# Patient Record
Sex: FEMALE | Race: WHITE | NOT HISPANIC OR LATINO
[De-identification: names, ages, dates, MRNs, and addresses within clinical notes are randomized per-mention and may not be internally consistent; named-entity substitution may affect disease eponyms.]

---

## 2017-04-21 PROCEDURE — 82607 VITAMIN B-12: CPT | Performed by: INTERNAL MEDICINE

## 2017-04-21 PROCEDURE — 82746 ASSAY OF FOLIC ACID SERUM: CPT | Performed by: INTERNAL MEDICINE

## 2017-08-16 PROBLEM — E78.00 PURE HYPERCHOLESTEROLEMIA: Status: ACTIVE | Noted: 2017-08-16

## 2017-11-01 ENCOUNTER — CHARTING TRANS (OUTPATIENT)
Dept: OTHER | Age: 77
End: 2017-11-01

## 2017-11-06 ENCOUNTER — HOSPITAL ENCOUNTER (OUTPATIENT)
Dept: GENERAL RADIOLOGY | Age: 77
End: 2017-11-06
Attending: INTERNAL MEDICINE
Payer: MEDICARE

## 2017-11-06 ENCOUNTER — HOSPITAL ENCOUNTER (OUTPATIENT)
Dept: GENERAL RADIOLOGY | Age: 77
Discharge: HOME | End: 2017-11-06
Attending: INTERNAL MEDICINE
Payer: MEDICARE

## 2017-11-06 DIAGNOSIS — M54.41 CHRONIC LOW BACK PAIN WITH RIGHT-SIDED SCIATICA, UNSPECIFIED BACK PAIN LATERALITY: ICD-10-CM

## 2017-11-06 DIAGNOSIS — G89.29 CHRONIC LOW BACK PAIN WITH RIGHT-SIDED SCIATICA, UNSPECIFIED BACK PAIN LATERALITY: ICD-10-CM

## 2017-11-06 DIAGNOSIS — M25.552 PAIN OF LEFT HIP JOINT: ICD-10-CM

## 2017-11-06 DIAGNOSIS — M15.9 PRIMARY OSTEOARTHRITIS INVOLVING MULTIPLE JOINTS: ICD-10-CM

## 2017-11-06 PROCEDURE — 77077 JOINT SURVEY SINGLE VIEW: CPT | Performed by: INTERNAL MEDICINE

## 2017-11-06 PROCEDURE — 72110 X-RAY EXAM L-2 SPINE 4/>VWS: CPT | Performed by: INTERNAL MEDICINE

## 2017-11-06 PROCEDURE — 73502 X-RAY EXAM HIP UNI 2-3 VIEWS: CPT | Performed by: INTERNAL MEDICINE

## 2017-11-26 ENCOUNTER — APPOINTMENT (OUTPATIENT)
Dept: CT IMAGING | Age: 77
End: 2017-11-26
Attending: PHYSICIAN ASSISTANT
Payer: MEDICARE

## 2017-11-26 ENCOUNTER — HOSPITAL ENCOUNTER (OUTPATIENT)
Age: 77
Discharge: HOME | End: 2017-11-26
Payer: MEDICARE

## 2017-11-26 VITALS
HEIGHT: 66 IN | TEMPERATURE: 98 F | RESPIRATION RATE: 16 BRPM | BODY MASS INDEX: 25.71 KG/M2 | OXYGEN SATURATION: 99 % | SYSTOLIC BLOOD PRESSURE: 160 MMHG | DIASTOLIC BLOOD PRESSURE: 66 MMHG | HEART RATE: 60 BPM | WEIGHT: 160 LBS

## 2017-11-26 DIAGNOSIS — R10.9 ABDOMINAL PAIN, RIGHT LATERAL: Primary | ICD-10-CM

## 2017-11-26 PROCEDURE — 74177 CT ABD & PELVIS W/CONTRAST: CPT | Performed by: PHYSICIAN ASSISTANT

## 2017-11-26 PROCEDURE — 99204 OFFICE O/P NEW MOD 45 MIN: CPT

## 2017-11-26 PROCEDURE — 81002 URINALYSIS NONAUTO W/O SCOPE: CPT | Performed by: PHYSICIAN ASSISTANT

## 2017-11-26 PROCEDURE — 99214 OFFICE O/P EST MOD 30 MIN: CPT

## 2017-11-26 PROCEDURE — 85025 COMPLETE CBC W/AUTO DIFF WBC: CPT | Performed by: PHYSICIAN ASSISTANT

## 2017-11-26 PROCEDURE — 80047 BASIC METABLC PNL IONIZED CA: CPT

## 2017-11-26 PROCEDURE — 36415 COLL VENOUS BLD VENIPUNCTURE: CPT

## 2017-11-26 NOTE — ED INITIAL ASSESSMENT (HPI)
Patient presents awake and alert with complaints of right middle quadrant abdominal pain x 3 days. Last bowel movement formed on Tuesday. Patient states that the pain is constant in nature and feels like a pressure.  Patient states that the pain changes in

## 2017-11-26 NOTE — ED PROVIDER NOTES
Patient Seen in: [facility] END    History   Patient presents with:  Abdomen/Flank Pain (GI/)    Stated Complaint: lower abdominal pain started wednesday    HPI    CHIEF COMPLAINT: Right-sided abdominal pain    HISTORY OF PRESENT The patient's medication list, past medical history and social history elements is as listed in today's nurse's notes are reviewed and agree.  The patient's family history is reviewed and is noncontributory to the presenting problem, except as indicated as PAIN MANAGEMENT  11/11/2014: INJECTION, W/WO CONTRAST, DX/THERAPEUTIC SUBST* N/A      Comment: Procedure: CERVICAL EPIDURAL;  Surgeon:                Ani Gomez MD;  Location: [place] [address] MANAGEMENT  7/2001: Ekaterina De Leon Comment: occ      Review of Systems    Positive for stated complaint: lower abdominal pain started wednesday  Other systems are as noted in HPI. Constitutional and vital signs reviewed.       All other systems reviewed and negative except as noted above Psychiatric: She has a normal mood and affect. Her behavior is normal. Judgment and thought content normal.   Nursing note and vitals reviewed.         ED Course     Labs Reviewed   POCT URINALYSIS DIPSTICK - Abnormal; Notable for the following:        Resu 1210: Patient is alert, no acute distress. Patient's abdominal examination has not changed, still some mild tenderness in the right abdomen. Patient CT the abdomen did not show any acute findings.   Patient will be referred back to her primary care physic

## 2018-02-07 ENCOUNTER — HOSPITAL ENCOUNTER (OUTPATIENT)
Dept: CT IMAGING | Facility: HOSPITAL | Age: 78
Discharge: HOME | End: 2018-02-07
Attending: UROLOGY
Payer: MEDICARE

## 2018-02-07 ENCOUNTER — APPOINTMENT (OUTPATIENT)
Dept: LAB | Facility: HOSPITAL | Age: 78
End: 2018-02-07
Attending: UROLOGY
Payer: MEDICARE

## 2018-02-07 VITALS
WEIGHT: 155 LBS | HEIGHT: 65 IN | RESPIRATION RATE: 16 BRPM | SYSTOLIC BLOOD PRESSURE: 126 MMHG | BODY MASS INDEX: 25.83 KG/M2 | OXYGEN SATURATION: 100 % | DIASTOLIC BLOOD PRESSURE: 66 MMHG | HEART RATE: 69 BPM | TEMPERATURE: 98 F

## 2018-02-07 DIAGNOSIS — N28.89 RENAL MASS, LEFT: ICD-10-CM

## 2018-02-07 DIAGNOSIS — N28.89 LEFT RENAL MASS: ICD-10-CM

## 2018-02-07 DIAGNOSIS — N28.89 RENAL MASS, LEFT: Primary | ICD-10-CM

## 2018-02-07 LAB
ERYTHROCYTE [DISTWIDTH] IN BLOOD BY AUTOMATED COUNT: 12 % (ref 11.5–16)
HCT VFR BLD AUTO: 39.5 % (ref 34–50)
HGB BLD-MCNC: 13 G/DL (ref 12–16)
INR BLD: 0.93 (ref 0.89–1.11)
MCH RBC QN AUTO: 32.1 PG (ref 27–33.2)
MCHC RBC AUTO-ENTMCNC: 32.9 G/DL (ref 31–37)
MCV RBC AUTO: 97.5 FL (ref 81–100)
PLATELET # BLD AUTO: 327 10(3)UL (ref 150–450)
PSA SERPL DL<=0.01 NG/ML-MCNC: 12.5 SECONDS (ref 12–14.3)
RBC # BLD AUTO: 4.05 X10(6)UL (ref 3.8–5.1)
RED CELL DISTRIBUTION WIDTH-SD: 43.5 FL (ref 35.1–46.3)
WBC # BLD AUTO: 5.5 X10(3) UL (ref 4–13)

## 2018-02-07 PROCEDURE — 88341 IMHCHEM/IMCYTCHM EA ADD ANTB: CPT | Performed by: UROLOGY

## 2018-02-07 PROCEDURE — 36415 COLL VENOUS BLD VENIPUNCTURE: CPT

## 2018-02-07 PROCEDURE — 88108 CYTOPATH CONCENTRATE TECH: CPT | Performed by: UROLOGY

## 2018-02-07 PROCEDURE — 85027 COMPLETE CBC AUTOMATED: CPT

## 2018-02-07 PROCEDURE — 88342 IMHCHEM/IMCYTCHM 1ST ANTB: CPT | Performed by: UROLOGY

## 2018-02-07 PROCEDURE — 77012 CT SCAN FOR NEEDLE BIOPSY: CPT | Performed by: UROLOGY

## 2018-02-07 PROCEDURE — 85610 PROTHROMBIN TIME: CPT

## 2018-02-07 PROCEDURE — 88305 TISSUE EXAM BY PATHOLOGIST: CPT | Performed by: UROLOGY

## 2018-02-07 PROCEDURE — 50200 RENAL BIOPSY PERQ: CPT | Performed by: UROLOGY

## 2018-02-07 PROCEDURE — 99152 MOD SED SAME PHYS/QHP 5/>YRS: CPT | Performed by: UROLOGY

## 2018-02-07 RX ORDER — FLUMAZENIL 0.1 MG/ML
0.2 INJECTION, SOLUTION INTRAVENOUS AS NEEDED
Status: DISCONTINUED | OUTPATIENT
Start: 2018-02-07 | End: 2018-02-16

## 2018-02-07 RX ORDER — MIDAZOLAM HYDROCHLORIDE 1 MG/ML
1 INJECTION INTRAMUSCULAR; INTRAVENOUS EVERY 5 MIN PRN
Status: ACTIVE | OUTPATIENT
Start: 2018-02-07 | End: 2018-02-07

## 2018-02-07 RX ORDER — NALOXONE HYDROCHLORIDE 0.4 MG/ML
80 INJECTION, SOLUTION INTRAMUSCULAR; INTRAVENOUS; SUBCUTANEOUS AS NEEDED
Status: DISCONTINUED | OUTPATIENT
Start: 2018-02-07 | End: 2018-02-16

## 2018-02-07 RX ORDER — SODIUM CHLORIDE 9 MG/ML
INJECTION, SOLUTION INTRAVENOUS CONTINUOUS
Status: DISCONTINUED | OUTPATIENT
Start: 2018-02-07 | End: 2018-02-16

## 2018-02-07 RX ORDER — MIDAZOLAM HYDROCHLORIDE 1 MG/ML
INJECTION INTRAMUSCULAR; INTRAVENOUS
Status: COMPLETED
Start: 2018-02-07 | End: 2018-02-07

## 2018-02-07 RX ADMIN — MIDAZOLAM HYDROCHLORIDE 0.5 MG: 1 INJECTION INTRAMUSCULAR; INTRAVENOUS at 09:39:00

## 2018-02-07 RX ADMIN — MIDAZOLAM HYDROCHLORIDE 1 MG: 1 INJECTION INTRAMUSCULAR; INTRAVENOUS at 09:34:00

## 2018-02-07 RX ADMIN — SODIUM CHLORIDE: 9 INJECTION, SOLUTION INTRAVENOUS at 09:30:00

## 2018-02-07 NOTE — IMAGING NOTE
CT guided left renal mass with Dr. Rios Carey. Pt tolerated well. Vss. Fluid and biopsy samples sent to lab. Biopsy site and dressing intact. Report to [facility]. Transport to room 2258.

## 2018-08-17 ENCOUNTER — LABORATORY ENCOUNTER (OUTPATIENT)
Dept: LAB | Age: 78
End: 2018-08-17
Payer: MEDICARE

## 2018-08-17 DIAGNOSIS — C64.9 RENAL CELL CARCINOMA (HCC): ICD-10-CM

## 2018-08-17 LAB
ANION GAP SERPL CALC-SCNC: 10 MMOL/L (ref 0–18)
ANTIBODY SCREEN: NEGATIVE
BASOPHILS # BLD AUTO: 0.05 X10(3) UL (ref 0–0.1)
BASOPHILS NFR BLD AUTO: 0.8 %
BUN BLD-MCNC: 22 MG/DL (ref 8–20)
BUN/CREAT SERPL: 26.8 (ref 10–20)
CALCIUM BLD-MCNC: 9.4 MG/DL (ref 8.3–10.3)
CHLORIDE SERPL-SCNC: 103 MMOL/L (ref 101–111)
CO2 SERPL-SCNC: 27 MMOL/L (ref 22–32)
CREAT BLD-MCNC: 0.82 MG/DL (ref 0.55–1.02)
EOSINOPHIL # BLD AUTO: 0.27 X10(3) UL (ref 0–0.3)
EOSINOPHIL NFR BLD AUTO: 4.5 %
ERYTHROCYTE [DISTWIDTH] IN BLOOD BY AUTOMATED COUNT: 12 % (ref 11.5–16)
GLUCOSE BLD-MCNC: 95 MG/DL (ref 70–99)
HCT VFR BLD AUTO: 40.2 % (ref 34–50)
HGB BLD-MCNC: 13.3 G/DL (ref 12–16)
IMMATURE GRANULOCYTE COUNT: 0.02 X10(3) UL (ref 0–1)
IMMATURE GRANULOCYTE RATIO %: 0.3 %
LYMPHOCYTES # BLD AUTO: 2.09 X10(3) UL (ref 0.9–4)
LYMPHOCYTES NFR BLD AUTO: 34.9 %
MCH RBC QN AUTO: 32.4 PG (ref 27–33.2)
MCHC RBC AUTO-ENTMCNC: 33.1 G/DL (ref 31–37)
MCV RBC AUTO: 98 FL (ref 81–100)
MONOCYTES # BLD AUTO: 0.65 X10(3) UL (ref 0.1–1)
MONOCYTES NFR BLD AUTO: 10.9 %
NEUTROPHIL ABS PRELIM: 2.91 X10 (3) UL (ref 1.3–6.7)
NEUTROPHILS # BLD AUTO: 2.91 X10(3) UL (ref 1.3–6.7)
NEUTROPHILS NFR BLD AUTO: 48.6 %
OSMOLALITY SERPL CALC.SUM OF ELEC: 293 MOSM/KG (ref 275–295)
PLATELET # BLD AUTO: 314 10(3)UL (ref 150–450)
POTASSIUM SERPL-SCNC: 4.2 MMOL/L (ref 3.6–5.1)
RBC # BLD AUTO: 4.1 X10(6)UL (ref 3.8–5.1)
RED CELL DISTRIBUTION WIDTH-SD: 43.1 FL (ref 35.1–46.3)
RH BLOOD TYPE: POSITIVE
SODIUM SERPL-SCNC: 140 MMOL/L (ref 136–144)
WBC # BLD AUTO: 6 X10(3) UL (ref 4–13)

## 2018-08-17 PROCEDURE — 86900 BLOOD TYPING SEROLOGIC ABO: CPT

## 2018-08-17 PROCEDURE — 36415 COLL VENOUS BLD VENIPUNCTURE: CPT

## 2018-08-17 PROCEDURE — 85025 COMPLETE CBC W/AUTO DIFF WBC: CPT

## 2018-08-17 PROCEDURE — 86901 BLOOD TYPING SEROLOGIC RH(D): CPT

## 2018-08-17 PROCEDURE — 80048 BASIC METABOLIC PNL TOTAL CA: CPT

## 2018-08-17 PROCEDURE — 86850 RBC ANTIBODY SCREEN: CPT

## 2018-08-24 ENCOUNTER — HOSPITAL ENCOUNTER (INPATIENT)
Facility: HOSPITAL | Age: 78
LOS: 3 days | Discharge: HOME | DRG: 658 | End: 2018-08-27
Attending: UROLOGY | Admitting: UROLOGY
Payer: MEDICARE

## 2018-08-24 ENCOUNTER — ANESTHESIA (OUTPATIENT)
Dept: SURGERY | Facility: HOSPITAL | Age: 78
DRG: 658 | End: 2018-08-24
Payer: MEDICARE

## 2018-08-24 ENCOUNTER — ANESTHESIA EVENT (OUTPATIENT)
Dept: SURGERY | Facility: HOSPITAL | Age: 78
DRG: 658 | End: 2018-08-24
Payer: MEDICARE

## 2018-08-24 DIAGNOSIS — C64.2 RENAL CELL CARCINOMA OF LEFT KIDNEY (HCC): ICD-10-CM

## 2018-08-24 DIAGNOSIS — C64.9 RENAL CELL CARCINOMA (HCC): Primary | ICD-10-CM

## 2018-08-24 PROCEDURE — 88307 TISSUE EXAM BY PATHOLOGIST: CPT | Performed by: UROLOGY

## 2018-08-24 PROCEDURE — 8E0W4CZ ROBOTIC ASSISTED PROCEDURE OF TRUNK REGION, PERCUTANEOUS ENDOSCOPIC APPROACH: ICD-10-PCS | Performed by: UROLOGY

## 2018-08-24 PROCEDURE — 0TB14ZZ EXCISION OF LEFT KIDNEY, PERCUTANEOUS ENDOSCOPIC APPROACH: ICD-10-PCS | Performed by: UROLOGY

## 2018-08-24 RX ORDER — ALBUTEROL SULFATE 90 UG/1
1 AEROSOL, METERED RESPIRATORY (INHALATION) EVERY 6 HOURS PRN
Status: DISCONTINUED | OUTPATIENT
Start: 2018-08-24 | End: 2018-08-27

## 2018-08-24 RX ORDER — ASPIRIN 81 MG/1
81 TABLET ORAL DAILY
COMMUNITY

## 2018-08-24 RX ORDER — LABETALOL HYDROCHLORIDE 5 MG/ML
5 INJECTION, SOLUTION INTRAVENOUS EVERY 5 MIN PRN
Status: DISCONTINUED | OUTPATIENT
Start: 2018-08-24 | End: 2018-08-24 | Stop reason: HOSPADM

## 2018-08-24 RX ORDER — ESCITALOPRAM OXALATE 5 MG/1
5 TABLET ORAL DAILY
Status: DISCONTINUED | OUTPATIENT
Start: 2018-08-24 | End: 2018-08-27

## 2018-08-24 RX ORDER — SODIUM CHLORIDE, SODIUM LACTATE, POTASSIUM CHLORIDE, CALCIUM CHLORIDE 600; 310; 30; 20 MG/100ML; MG/100ML; MG/100ML; MG/100ML
INJECTION, SOLUTION INTRAVENOUS CONTINUOUS
Status: DISCONTINUED | OUTPATIENT
Start: 2018-08-24 | End: 2018-08-24 | Stop reason: HOSPADM

## 2018-08-24 RX ORDER — ONDANSETRON 2 MG/ML
4 INJECTION INTRAMUSCULAR; INTRAVENOUS AS NEEDED
Status: DISCONTINUED | OUTPATIENT
Start: 2018-08-24 | End: 2018-08-24 | Stop reason: HOSPADM

## 2018-08-24 RX ORDER — CEFAZOLIN SODIUM/WATER 2 G/20 ML
2 SYRINGE (ML) INTRAVENOUS ONCE
Status: COMPLETED | OUTPATIENT
Start: 2018-08-24 | End: 2018-08-24

## 2018-08-24 RX ORDER — ACETAMINOPHEN 500 MG
1000 TABLET ORAL ONCE
Status: DISCONTINUED | OUTPATIENT
Start: 2018-08-24 | End: 2018-08-24 | Stop reason: HOSPADM

## 2018-08-24 RX ORDER — LOSARTAN POTASSIUM 100 MG/1
100 TABLET ORAL DAILY
COMMUNITY
End: 2018-09-25

## 2018-08-24 RX ORDER — MORPHINE SULFATE 4 MG/ML
2 INJECTION, SOLUTION INTRAMUSCULAR; INTRAVENOUS EVERY 5 MIN PRN
Status: DISCONTINUED | OUTPATIENT
Start: 2018-08-24 | End: 2018-08-24 | Stop reason: HOSPADM

## 2018-08-24 RX ORDER — MORPHINE SULFATE 4 MG/ML
INJECTION, SOLUTION INTRAMUSCULAR; INTRAVENOUS
Status: DISPENSED
Start: 2018-08-24 | End: 2018-08-25

## 2018-08-24 RX ORDER — HEPARIN SODIUM 5000 [USP'U]/ML
5000 INJECTION, SOLUTION INTRAVENOUS; SUBCUTANEOUS ONCE
Status: COMPLETED | OUTPATIENT
Start: 2018-08-24 | End: 2018-08-24

## 2018-08-24 RX ORDER — LOSARTAN POTASSIUM 100 MG/1
100 TABLET ORAL DAILY
Status: DISCONTINUED | OUTPATIENT
Start: 2018-08-25 | End: 2018-08-27

## 2018-08-24 RX ORDER — NALOXONE HYDROCHLORIDE 0.4 MG/ML
80 INJECTION, SOLUTION INTRAMUSCULAR; INTRAVENOUS; SUBCUTANEOUS AS NEEDED
Status: DISCONTINUED | OUTPATIENT
Start: 2018-08-24 | End: 2018-08-24 | Stop reason: HOSPADM

## 2018-08-24 RX ORDER — AMLODIPINE BESYLATE 5 MG/1
5 TABLET ORAL DAILY
COMMUNITY
End: 2018-09-25

## 2018-08-24 RX ORDER — MELOXICAM 7.5 MG/1
7.5 TABLET ORAL DAILY
Status: ON HOLD | COMMUNITY
End: 2018-08-27

## 2018-08-24 RX ORDER — DOCUSATE SODIUM 100 MG/1
100 CAPSULE, LIQUID FILLED ORAL 2 TIMES DAILY
Status: DISCONTINUED | OUTPATIENT
Start: 2018-08-24 | End: 2018-08-27

## 2018-08-24 RX ORDER — BUPIVACAINE HYDROCHLORIDE 2.5 MG/ML
INJECTION, SOLUTION EPIDURAL; INFILTRATION; INTRACAUDAL AS NEEDED
Status: DISCONTINUED | OUTPATIENT
Start: 2018-08-24 | End: 2018-08-24 | Stop reason: HOSPADM

## 2018-08-24 RX ORDER — HYDROCODONE BITARTRATE AND ACETAMINOPHEN 5; 325 MG/1; MG/1
2 TABLET ORAL EVERY 4 HOURS PRN
Status: DISCONTINUED | OUTPATIENT
Start: 2018-08-24 | End: 2018-08-27

## 2018-08-24 RX ORDER — AMLODIPINE BESYLATE 5 MG/1
5 TABLET ORAL DAILY
Status: DISCONTINUED | OUTPATIENT
Start: 2018-08-25 | End: 2018-08-27

## 2018-08-24 RX ORDER — ESCITALOPRAM OXALATE 5 MG/1
5 TABLET ORAL DAILY
COMMUNITY
End: 2018-09-25

## 2018-08-24 RX ORDER — ONDANSETRON 2 MG/ML
4 INJECTION INTRAMUSCULAR; INTRAVENOUS EVERY 6 HOURS PRN
Status: DISCONTINUED | OUTPATIENT
Start: 2018-08-24 | End: 2018-08-27

## 2018-08-24 RX ORDER — HYDROCODONE BITARTRATE AND ACETAMINOPHEN 5; 325 MG/1; MG/1
1 TABLET ORAL EVERY 4 HOURS PRN
Status: DISCONTINUED | OUTPATIENT
Start: 2018-08-24 | End: 2018-08-27

## 2018-08-24 RX ORDER — SODIUM CHLORIDE 9 MG/ML
INJECTION, SOLUTION INTRAVENOUS CONTINUOUS
Status: DISCONTINUED | OUTPATIENT
Start: 2018-08-24 | End: 2018-08-27

## 2018-08-24 RX ORDER — MORPHINE SULFATE 4 MG/ML
2 INJECTION, SOLUTION INTRAMUSCULAR; INTRAVENOUS EVERY 2 HOUR PRN
Status: DISCONTINUED | OUTPATIENT
Start: 2018-08-24 | End: 2018-08-27

## 2018-08-24 RX ORDER — ACETAMINOPHEN 325 MG/1
650 TABLET ORAL EVERY 4 HOURS PRN
Status: DISCONTINUED | OUTPATIENT
Start: 2018-08-24 | End: 2018-08-26

## 2018-08-24 RX ORDER — CEFAZOLIN SODIUM/WATER 2 G/20 ML
2 SYRINGE (ML) INTRAVENOUS EVERY 8 HOURS
Status: COMPLETED | OUTPATIENT
Start: 2018-08-24 | End: 2018-08-25

## 2018-08-24 RX ORDER — ONDANSETRON 4 MG/1
4 TABLET, FILM COATED ORAL EVERY 6 HOURS PRN
Status: DISCONTINUED | OUTPATIENT
Start: 2018-08-24 | End: 2018-08-27

## 2018-08-24 RX ORDER — MORPHINE SULFATE 4 MG/ML
4 INJECTION, SOLUTION INTRAMUSCULAR; INTRAVENOUS EVERY 2 HOUR PRN
Status: DISCONTINUED | OUTPATIENT
Start: 2018-08-24 | End: 2018-08-27

## 2018-08-24 RX ORDER — MIDAZOLAM HYDROCHLORIDE 1 MG/ML
1 INJECTION INTRAMUSCULAR; INTRAVENOUS EVERY 5 MIN PRN
Status: DISCONTINUED | OUTPATIENT
Start: 2018-08-24 | End: 2018-08-24 | Stop reason: HOSPADM

## 2018-08-24 NOTE — PLAN OF CARE
NURSING ADMISSION NOTE      Patient admitted via bed from PACU. Oriented to room. Safety precautions initiated. Bed in low position. Call light in reach. Admission database completed with the help of spouse and pt. Pt denies nausea.  Will medicate

## 2018-08-24 NOTE — ANESTHESIA PREPROCEDURE EVALUATION
PRE-OP EVALUATION    Patient Name: Alvina Rodrigez    Pre-op Diagnosis: Renal cell carcinoma of left kidney ([facility] [address] [C64.2]    Procedure(s):  XI ROBOTIC ASSISTED LAPAROSCOPIC LEFT PARTIAL NEPHRECTOMY    Surgeon(s) and Role:     * Denise Ibarra MD - P 1956  2001: CHOLECYSTECTOMY  11/18/2009: COLONOSCOPY      Comment: internal/external hemorrhoids; repeat in 8-10                years  12/03: COLONOSCOPY,DIAGNOSTIC      Comment: internal hemorrhoids; repeat in 5-7 years due                to family histor Comment: Procedure: ;  Surgeon: Steven Mitchell MD;                 Location: [address]: REMOVAL OF TONSILS,12+ Y/O  7/7/04: SPECIAL SERVICE OR REPORT      Comment: LEFT KNEE ARTHROSCOPY FOR MENISCAL                TEAR;BACH'S C

## 2018-08-24 NOTE — PLAN OF CARE
Problem: Patient/Family Goals  Goal: Patient/Family Long Term Goal  Patient's Long Term Goal: feel better and go home  Interventions:  - follow plan of care  - See additional Care Plan goals for specific interventions   Outcome: Progressing    Goal: Patien

## 2018-08-24 NOTE — CONSULTS
[facility] hospitalist initial consult note  PCP Jess Hidalgo MD  Consulted at the request of Dr. Sarah England  Reason for consult medical co-management  HPI 79 yo female with multiple medical problems including but not limited to asthma, melanoma here s/p Location: [address] [place] [address] Bill Ripton MANAGEMENT  10/23/2014: INJECTION, W/WO CONTRAST, DX/THERAPEUTIC SUBST* N/A      Comment: Procedure: CERVICAL EPIDURAL;  Surgeon:                Neto Parks MD;  Location: [place] [address] Onset   • Heart Disorder Father      MI   • Cancer Father      MELANOMA   • Heart Disorder Mother      CHF  CAD   • Cancer Mother      MELANOMA   • Cancer Maternal Grandmother      CERVICAL   • Cancer Paternal Grandmother      OVARIAN   • Heart Disorder Evonne Garcia supple  Lymph no tender cervical LAD  CV: RRR, nl S1/S2  Pulm: CTA b/l  Abd; soft, not distended, incisions c/d/i, +drain,  +BS  Ext: no edema    Labs  No results for input(s): RBC, HGB, HCT, MCV, MCH, MCHC, RDW, NEPRELIM, WBC, PLT in the last 168 hours.

## 2018-08-24 NOTE — H&P
Pre-op Diagnosis: Renal cell carcinoma of left kidney (HCC) [C64.2]    The above referenced H&P was reviewed by Ángel Calvin MD on 8/24/2018, the patient was examined and no significant changes have occurred in the patient's condition since the H&P wa

## 2018-08-24 NOTE — ANESTHESIA POSTPROCEDURE EVALUATION
[address] Patient Status:  Surgery Admit   Age/Gender 78year old female MRN [id number]   [facility] SURGERY Attending Tod Mcgrath MD   Hosp Day # 0 PCP Sue Swenson MD       Anesthesia Post-op Note

## 2018-08-25 LAB
ANION GAP SERPL CALC-SCNC: 6 MMOL/L (ref 0–18)
BUN BLD-MCNC: 14 MG/DL (ref 8–20)
BUN/CREAT SERPL: 14 (ref 10–20)
CALCIUM BLD-MCNC: 8.7 MG/DL (ref 8.3–10.3)
CHLORIDE SERPL-SCNC: 105 MMOL/L (ref 101–111)
CO2 SERPL-SCNC: 28 MMOL/L (ref 22–32)
CREAT BLD-MCNC: 1 MG/DL (ref 0.55–1.02)
ERYTHROCYTE [DISTWIDTH] IN BLOOD BY AUTOMATED COUNT: 11.8 % (ref 11.5–16)
GLUCOSE BLD-MCNC: 112 MG/DL (ref 70–99)
HCT VFR BLD AUTO: 34.9 % (ref 34–50)
HGB BLD-MCNC: 11.6 G/DL (ref 12–16)
MCH RBC QN AUTO: 32.9 PG (ref 27–33.2)
MCHC RBC AUTO-ENTMCNC: 33.2 G/DL (ref 31–37)
MCV RBC AUTO: 98.9 FL (ref 81–100)
OSMOLALITY SERPL CALC.SUM OF ELEC: 289 MOSM/KG (ref 275–295)
PLATELET # BLD AUTO: 263 10(3)UL (ref 150–450)
POTASSIUM SERPL-SCNC: 4.8 MMOL/L (ref 3.6–5.1)
RBC # BLD AUTO: 3.53 X10(6)UL (ref 3.8–5.1)
RED CELL DISTRIBUTION WIDTH-SD: 42.6 FL (ref 35.1–46.3)
SODIUM SERPL-SCNC: 139 MMOL/L (ref 136–144)
WBC # BLD AUTO: 13.1 X10(3) UL (ref 4–13)

## 2018-08-25 PROCEDURE — 85027 COMPLETE CBC AUTOMATED: CPT | Performed by: UROLOGY

## 2018-08-25 PROCEDURE — 80048 BASIC METABOLIC PNL TOTAL CA: CPT | Performed by: UROLOGY

## 2018-08-25 NOTE — PROGRESS NOTES
[facility] Urology   Progress Note  Shivani Sepulveda Patient Status:  Inpatient    10/14/1940 MRN [id number]   [facility] 3NW-A Attending Leticia Nageotte, MD   Hosp Day # 1 PCP Rosa Betts MD     Subjective:  No issue

## 2018-08-25 NOTE — PROGRESS NOTES
[facility] hospitalist daily note  Seen/examined on 8/25/18    S; no chest pain, no SOB, did have pain in the abdomen.  RN instructed to notify surgery  Per patient she is passing gas    Medications in EPIC    PE    08/25/18  1300   BP: 151/66   Pulse: 77   Resp:

## 2018-08-25 NOTE — PLAN OF CARE
Maintains or returns to baseline bowel function Not Progressing      Minimal or absence of nausea and vomiting Progressing      Maintains hematologic stability Progressing      Free from bleeding injury Progressing      Return mobility to safest level of f

## 2018-08-26 LAB
ERYTHROCYTE [DISTWIDTH] IN BLOOD BY AUTOMATED COUNT: 11.9 % (ref 11.5–16)
HCT VFR BLD AUTO: 35.4 % (ref 34–50)
HGB BLD-MCNC: 11.3 G/DL (ref 12–16)
MCH RBC QN AUTO: 32.2 PG (ref 27–33.2)
MCHC RBC AUTO-ENTMCNC: 31.9 G/DL (ref 31–37)
MCV RBC AUTO: 100.9 FL (ref 81–100)
PLATELET # BLD AUTO: 249 10(3)UL (ref 150–450)
RBC # BLD AUTO: 3.51 X10(6)UL (ref 3.8–5.1)
RED CELL DISTRIBUTION WIDTH-SD: 44.6 FL (ref 35.1–46.3)
WBC # BLD AUTO: 9.3 X10(3) UL (ref 4–13)

## 2018-08-26 PROCEDURE — 85027 COMPLETE CBC AUTOMATED: CPT | Performed by: UROLOGY

## 2018-08-26 RX ORDER — BISACODYL 10 MG
10 SUPPOSITORY, RECTAL RECTAL
Status: DISCONTINUED | OUTPATIENT
Start: 2018-08-26 | End: 2018-08-27

## 2018-08-26 RX ORDER — ACETAMINOPHEN 500 MG
1000 TABLET ORAL EVERY 8 HOURS PRN
Status: DISCONTINUED | OUTPATIENT
Start: 2018-08-26 | End: 2018-08-27

## 2018-08-26 NOTE — CERTIFICATION
**Certification    PHYSICIAN Certification of Need for Inpatient Hospitalization    Based on the her current state of illness, Charlene Perkins requires inpatient hospitalization for her urology surgery

## 2018-08-26 NOTE — PROGRESS NOTES
[facility] hospitalist daily note  Seen/examined on 8/26/18     S; no chest pain, no SOB, no abdominal pain at thsi time and passed gas     Medications in EPIC     PE    08/26/18  0814   BP: 137/65   Pulse: 73   Resp: 18   Temp: 98.3 °F (36.8 °C)     Gen: awake,

## 2018-08-26 NOTE — PROGRESS NOTES
Subjective:  No issues over night. Pain only during movement.  No N/V, no BM yet; denies SOB/CP.      Objective:  VSS/afebrile   General appearance: alert and cooperative  Lungs: Unlabored respirations  Abdomen: Soft, non-tender, not distended, bowel sounds

## 2018-08-26 NOTE — PROGRESS NOTES
Requested order for Dulcolax suppository for the patient from Dr. Jem Burgess, patient states that she is unable to pass flatus. Telephone order received from Dr. Jem Burgess for the Dulcolax suppository.

## 2018-08-26 NOTE — PLAN OF CARE
Minimal or absence of nausea and vomiting Progressing      Maintains or returns to baseline bowel function Progressing      Maintains hematologic stability Progressing      Free from bleeding injury Progressing      Return mobility to safest level of funct

## 2018-08-27 VITALS
TEMPERATURE: 99 F | HEIGHT: 66 IN | RESPIRATION RATE: 18 BRPM | OXYGEN SATURATION: 95 % | DIASTOLIC BLOOD PRESSURE: 62 MMHG | WEIGHT: 157.19 LBS | SYSTOLIC BLOOD PRESSURE: 122 MMHG | BODY MASS INDEX: 25.26 KG/M2 | HEART RATE: 77 BPM

## 2018-08-27 LAB
CREAT FLD-MCNC: 0.64 MG/DL
ERYTHROCYTE [DISTWIDTH] IN BLOOD BY AUTOMATED COUNT: 11.7 % (ref 11.5–16)
HCT VFR BLD AUTO: 34.1 % (ref 34–50)
HGB BLD-MCNC: 11 G/DL (ref 12–16)
MCH RBC QN AUTO: 32.3 PG (ref 27–33.2)
MCHC RBC AUTO-ENTMCNC: 32.3 G/DL (ref 31–37)
MCV RBC AUTO: 100 FL (ref 81–100)
PLATELET # BLD AUTO: 245 10(3)UL (ref 150–450)
RBC # BLD AUTO: 3.41 X10(6)UL (ref 3.8–5.1)
RED CELL DISTRIBUTION WIDTH-SD: 43.3 FL (ref 35.1–46.3)
WBC # BLD AUTO: 11 X10(3) UL (ref 4–13)

## 2018-08-27 PROCEDURE — 82570 ASSAY OF URINE CREATININE: CPT | Performed by: PHYSICIAN ASSISTANT

## 2018-08-27 PROCEDURE — 85027 COMPLETE CBC AUTOMATED: CPT | Performed by: UROLOGY

## 2018-08-27 RX ORDER — HYDROCODONE BITARTRATE AND ACETAMINOPHEN 5; 325 MG/1; MG/1
1 TABLET ORAL EVERY 4 HOURS PRN
Qty: 20 TABLET | Refills: 0 | Status: SHIPPED | OUTPATIENT
Start: 2018-08-27 | End: 2018-10-05

## 2018-08-27 RX ORDER — PSEUDOEPHEDRINE HCL 30 MG
100 TABLET ORAL 2 TIMES DAILY
Qty: 60 CAPSULE | Refills: 0 | Status: SHIPPED | OUTPATIENT
Start: 2018-08-27 | End: 2018-10-05

## 2018-08-27 NOTE — PROGRESS NOTES
Patient discharged home at this time. All discharge instructions were reviewed with the patient and patient's  at the bedside. All questions and concerns were addressed. IV access was removed. Medications delivered by Ananya Chavez from pharmacy.  Patient

## 2018-08-27 NOTE — PLAN OF CARE
Pt up to bathroom. Medium amount soft brown stool produced. Pt also reports passing flatus with bm. Zofran given earlier as noted on emar for nausea without emesis. Pt now admits to no longer having nausea.

## 2018-08-27 NOTE — PROGRESS NOTES
Notified Dr. Seth Edward that the patient was medicated with Zofran for nausea, however, she was able to pass flatus in the bathroom shortly after the Zofran administration.  Dr. Seth Edward stated to reduce Norco administration and give the patient Tylenol a

## 2018-08-27 NOTE — PROGRESS NOTES
[facility] Urology   Progress Note  Antoinette Zapata Patient Status:  Inpatient    10/14/1940 MRN [id number]   [facility] 3NW-A Attending Mikayla Glover MD   Hosp Day # 3 PCP Rey Dueñas MD     Subjective:  POD #3 s

## 2018-08-27 NOTE — CM/SW NOTE
Private caregiver list provided with pts DC paperwork in the event that additional assistance is needed at home

## 2018-08-27 NOTE — PROGRESS NOTES
[facility] Hospitalist Progress Note                                                                   [address]  10/14/1940    SUBJECTIVE:  Pain is tolerable. No fevers. Urinating ok. to [address]  [facility] Hospitalist  Pager: [phone]

## 2018-08-29 NOTE — PROGRESS NOTES
Results reviewed. Please hold for next office visit.   8/31/2018  11:45 AM   Luz Maria Fofana MD   [facility]           [facility]  9/6/2018   10:15 AM   Jolie Kerr MD      [address]

## 2018-08-31 PROBLEM — C64.2 RENAL CELL CARCINOMA OF LEFT KIDNEY (HCC): Status: ACTIVE | Noted: 2018-08-31

## 2018-09-04 NOTE — DISCHARGE SUMMARY
[facility]   Discharge Summary    Date of Admission: 8/24/2018    Date of Discharge: 8/27/2018  3:20 PM    Admitting Diagnosis: Renal cell carcinoma of left kidney ([facility] [address] [C64.2]  Renal cell carcinoma [facility])    Discharge Diagnosis: Patient Active Problem L

## 2018-09-09 PROBLEM — C64.2 RENAL CELL CANCER, LEFT (HCC): Status: ACTIVE | Noted: 2018-08-31

## 2018-10-05 PROBLEM — G31.84 MILD COGNITIVE IMPAIRMENT: Status: ACTIVE | Noted: 2018-10-05

## 2019-06-11 ENCOUNTER — LAB ENCOUNTER (OUTPATIENT)
Dept: LAB | Age: 79
End: 2019-06-11
Attending: Other
Payer: MEDICARE

## 2019-06-11 DIAGNOSIS — G31.84 MILD COGNITIVE IMPAIRMENT: ICD-10-CM

## 2019-06-11 PROCEDURE — 84439 ASSAY OF FREE THYROXINE: CPT

## 2019-06-11 PROCEDURE — 82607 VITAMIN B-12: CPT

## 2019-06-11 PROCEDURE — 84443 ASSAY THYROID STIM HORMONE: CPT

## 2019-06-11 PROCEDURE — 36415 COLL VENOUS BLD VENIPUNCTURE: CPT

## 2019-06-11 PROCEDURE — 82746 ASSAY OF FOLIC ACID SERUM: CPT

## 2019-06-11 NOTE — PROGRESS NOTES
Patient here for evaluation of memory loss. Has been occurring for the last few years. Is repeating herself, misplacing items, forgetting why she walked into a room.

## 2019-06-11 NOTE — PROGRESS NOTES
[facility]    Date of consult: 6/11/2019    CC: memory loss    HPI: Blayne Justin is a 70year old female with past medical history as listed below presents here for initial evaluation of memory loss.  Has been occurring for keratosis    • Asthma    • ASTHMA    • Baker's cyst 5/04    WITH LEFT MENISCUS INJRY   • Basal cell carcinoma     LEFT SHOULDER AND FACE   • Cholelithiasis    • DJD (degenerative joint disease), cervical 6/17/2009    C4-5, C5-6, C6-7 DEGENERATIVE CHANGES; BASAL CELL CA FROM SHOULDER   • SPECIAL SERVICE OR REPORT      REMOVAL OF ACTINIC KERATOSIS   • TONSILLECTOMY  1954   • TUBAL LIGATION  1978   • XI ROBOT-ASSISTED LAPAROSCOPIC NEPHRECTOMY Left 8/24/2018    Performed by Mikayla Glover MD at [address] [place] [address] radiculopathy  HTN    Plan:   Orders Placed This Encounter      B12 AND FOLATE      TSH and Free T4      Psychology Referral - In Network      MRI BRAIN (VDI=[id number]) [[id number]]  EEG  Aricept 5 mg qhs po  PCP follow up  Minimal use of tramadol   See orders an

## 2019-07-01 ENCOUNTER — NURSE ONLY (OUTPATIENT)
Dept: ELECTROPHYSIOLOGY | Facility: HOSPITAL | Age: 79
End: 2019-07-01
Attending: Other
Payer: MEDICARE

## 2019-07-01 DIAGNOSIS — G31.84 MILD COGNITIVE IMPAIRMENT: ICD-10-CM

## 2019-07-01 PROCEDURE — 95816 EEG AWAKE AND DROWSY: CPT | Performed by: OTHER

## 2019-07-01 NOTE — PROCEDURES
Date of Procedure: 7/1/2019    Procedure: EEG (ELECTROENCEPHALOGRAM)     DX:MILD COGNITIVE IMPAIRMENT  HX: A 78YR OLD FEMALE WHO PRESENTS WITH SOME MEMORY LOSS. PT HAS BEEN REPEATING HERSELF, MISPLACING ITEMS, FORGETTING WHY SHE WALKED INTO A ROOM.  DENIES

## 2019-07-03 ENCOUNTER — HOSPITAL ENCOUNTER (OUTPATIENT)
Dept: MRI IMAGING | Age: 79
Discharge: HOME | End: 2019-07-03
Attending: Other
Payer: MEDICARE

## 2019-07-03 DIAGNOSIS — G31.84 MILD COGNITIVE IMPAIRMENT: ICD-10-CM

## 2019-07-03 LAB — CREAT BLD-MCNC: 0.7 MG/DL (ref 0.55–1.02)

## 2019-07-03 PROCEDURE — 82565 ASSAY OF CREATININE: CPT

## 2019-07-03 PROCEDURE — A9575 INJ GADOTERATE MEGLUMI 0.1ML: HCPCS | Performed by: OTHER

## 2019-07-03 PROCEDURE — 70553 MRI BRAIN STEM W/O & W/DYE: CPT | Performed by: OTHER

## 2019-07-10 ENCOUNTER — TELEPHONE (OUTPATIENT)
Dept: NEUROLOGY | Facility: CLINIC | Age: 79
End: 2019-07-10

## 2019-07-10 NOTE — TELEPHONE ENCOUNTER
----- Message from Federica Santiago MD sent at 7/9/2019  4:38 PM CDT -----  Unremarkable MRI, meningioma does not require action.

## 2019-07-11 ENCOUNTER — TELEPHONE (OUTPATIENT)
Dept: NEUROLOGY | Facility: CLINIC | Age: 79
End: 2019-07-11

## 2019-07-11 NOTE — TELEPHONE ENCOUNTER
MRI results left on voicemail (ok per HIPAA consent). ----- Message from Martita Wise MD sent at 7/9/2019  4:38 PM CDT -----  Unremarkable MRI, meningioma does not require action.

## 2019-07-11 NOTE — TELEPHONE ENCOUNTER
Spoke to pt spouse. (ok per HIPPA) spouse asking if future appointments and referred appointments are necessary to keep due to unremarkable MRI. Informed pt spouse to keep future appointments to ensure pt receives care needed.  Spouse expressed understandin

## 2019-07-15 NOTE — TELEPHONE ENCOUNTER
Medication: DONEPEZIL HCL 5 MG     Date of last refill: 6/11/19 (#30/1)  [place]     Last office visit: 6/11/2019  Due back to clinic per last office note:  2 months  Date next office visit scheduled:    Future Appointme

## 2019-08-08 ENCOUNTER — TELEPHONE (OUTPATIENT)
Dept: NEUROLOGY | Facility: CLINIC | Age: 79
End: 2019-08-08

## 2019-08-09 ENCOUNTER — TELEPHONE (OUTPATIENT)
Dept: NEUROLOGY | Facility: CLINIC | Age: 79
End: 2019-08-09

## 2019-08-09 DIAGNOSIS — G31.84 MILD COGNITIVE IMPAIRMENT: ICD-10-CM

## 2019-08-09 RX ORDER — DONEPEZIL HYDROCHLORIDE 5 MG/1
TABLET, FILM COATED ORAL
Qty: 30 TABLET | Refills: 0 | OUTPATIENT
Start: 2019-08-09

## 2019-08-12 RX ORDER — DONEPEZIL HYDROCHLORIDE 5 MG/1
TABLET, FILM COATED ORAL
Qty: 90 TABLET | Refills: 0 | COMMUNITY
Start: 2019-08-12 | End: 2019-09-03

## 2019-08-12 NOTE — TELEPHONE ENCOUNTER
[facility] pharmacist states they did not receive the 7/15 Rx. Verbal order given for Donepezil 5mg #90 1 tab nightly with read-back confirmation received.

## 2019-08-12 NOTE — TELEPHONE ENCOUNTER
Explained rx Donepezil 5mg #90 was sent to [facility] on 7/15/15. Patient's  said the pharmacy has no more refills.

## 2019-09-03 NOTE — PROGRESS NOTES
[facility] Neurology outpatient progress note  Date of service: 9/3/2019    Patient here for a follow-up visit for memory loss. Since last visit no change noted for her memory.  Tolerating medication/aricept 5 mg without side effects, had tests done, here to revie 5/04    WITH LEFT MENISCUS INJRY   • Basal cell carcinoma     LEFT SHOULDER AND FACE   • Cholelithiasis    • DJD (degenerative joint disease), cervical 6/17/2009    C4-5, C5-6, C6-7 DEGENERATIVE CHANGES; MILD ENCROACHMENT AT C4-5, C5-6   • Floater, vitreou REPORT      REMOVAL OF ACTINIC KERATOSIS   • TONSILLECTOMY  1954   • TUBAL LIGATION  1978   • XI ROBOT-ASSISTED LAPAROSCOPIC NEPHRECTOMY Left 8/24/2018    Performed by Jessy Zuniga MD at [facility] MAIN OR     Social History:  Social History    Tobacco Use

## 2020-01-21 PROBLEM — G30.0 EARLY ONSET ALZHEIMER'S DEMENTIA WITHOUT BEHAVIORAL DISTURBANCE (HCC): Status: ACTIVE | Noted: 2020-01-21

## 2020-01-21 PROBLEM — F02.80 EARLY ONSET ALZHEIMER'S DEMENTIA WITHOUT BEHAVIORAL DISTURBANCE (HCC): Status: ACTIVE | Noted: 2020-01-21

## 2020-01-21 NOTE — PROGRESS NOTES
[facility] Neurology outpatient progress note  Date of service: 1/21/2020    Patient here for a follow-up visit for memory loss. Since last visit her memory is worse per .  Tolerating medication/aricept 10 mg without side effects, neuropsych is c/w dementia Aero Soln, Inhale 1 puff into the lungs every 6 (six) hours as needed for Wheezing.  Uses when exposed to cigarette smoke , Disp: , Rfl:   Allergies:    Sulfa Drugs Cross R*    RASH  Egg Yolk                NAUSEA AND VOMITING  Other                   OTHER SURGICAL HISTORY  10/1/10    Left Carpal Tunnel Release   • OTHER SURGICAL HISTORY  08/24/2018    Lt partial nephrectomy Dr. Sidra Sykes   • REMOVAL OF TONSILS,14+ Y/O  1952   • SPECIAL SERVICE OR REPORT  7/7/04    LEFT KNEE ARTHROSCOPY FOR MENISCAL TEAR;BA symmetric  Plantar response: bilateral flexor  Coordination: Normal FTN  Sensory: symmetric to PP and LT  Gait: nl  Romberg: nl  Neck: supple    Test reviewed on 1/21/2020    A/P:   Probable AD: worsening  H/o renal cell cancer  Cervical radiculopathy  HTN

## 2020-01-23 NOTE — TELEPHONE ENCOUNTER
Left message on pt home number informing her of new prescriptions sent to pharmacy. Requested pt call office with further questions or concerns.

## 2020-01-23 NOTE — TELEPHONE ENCOUNTER
Also received incoming fax regarding medication not being covered. Spoke with Gwendolyn at pharmacy. If 2 separate prescriptions sent for both 5mg and 10mg, should be covered as is generic. Rxs pended for review.

## 2020-01-23 NOTE — TELEPHONE ENCOUNTER
Spouse (on HIPAA) was told by Pharmacy that medication is going to cost $500. Spouse said they are on a fixed income and cannot afford this medication; wants to know if there is something else pt can take.

## 2020-01-29 NOTE — TELEPHONE ENCOUNTER
Call transferred from [facility] staff. Spoke with , [facility] (ok per HIPAA). Wanting to go over instructions for Namenda. Discussed medication instructions, all questions answered.

## 2020-02-13 DIAGNOSIS — F02.80 EARLY ONSET ALZHEIMER'S DEMENTIA WITHOUT BEHAVIORAL DISTURBANCE (HCC): ICD-10-CM

## 2020-02-13 DIAGNOSIS — G30.0 EARLY ONSET ALZHEIMER'S DEMENTIA WITHOUT BEHAVIORAL DISTURBANCE (HCC): ICD-10-CM

## 2020-02-14 RX ORDER — MEMANTINE HYDROCHLORIDE 5 MG/1
TABLET ORAL
Qty: 90 TABLET | Refills: 0 | Status: SHIPPED | OUTPATIENT
Start: 2020-02-14 | End: 2020-04-13

## 2020-02-14 NOTE — TELEPHONE ENCOUNTER
Medication: MEMANTINE HCL 5 MG Oral Tab    Date of last refill: 01/23/2020 (#42/0)  [place]    Last office visit: 1/21/2020  Due back to clinic per last office note:  Around 07/21/2020  Date next office visit schedul

## 2020-04-11 DIAGNOSIS — G30.0 EARLY ONSET ALZHEIMER'S DEMENTIA WITHOUT BEHAVIORAL DISTURBANCE (HCC): ICD-10-CM

## 2020-04-11 DIAGNOSIS — F02.80 EARLY ONSET ALZHEIMER'S DEMENTIA WITHOUT BEHAVIORAL DISTURBANCE (HCC): ICD-10-CM

## 2020-04-13 RX ORDER — MEMANTINE HYDROCHLORIDE 5 MG/1
TABLET ORAL
Qty: 90 TABLET | Refills: 0 | Status: SHIPPED | OUTPATIENT
Start: 2020-04-13 | End: 2020-06-01

## 2020-04-13 NOTE — TELEPHONE ENCOUNTER
Medication: MEMANTINE HCL 5 MG     Date of last refill: 2/14/20 (#90/0)  [place]    Last office visit: 1/21/2020  Due back to clinic per last office note:  6 months  Date next office visit scheduled:    Future Appoint

## 2020-06-01 DIAGNOSIS — F02.80 EARLY ONSET ALZHEIMER'S DEMENTIA WITHOUT BEHAVIORAL DISTURBANCE (HCC): ICD-10-CM

## 2020-06-01 DIAGNOSIS — G30.0 EARLY ONSET ALZHEIMER'S DEMENTIA WITHOUT BEHAVIORAL DISTURBANCE (HCC): ICD-10-CM

## 2020-06-01 RX ORDER — MEMANTINE HYDROCHLORIDE 5 MG/1
TABLET ORAL
Qty: 90 TABLET | Refills: 2 | Status: SHIPPED | OUTPATIENT
Start: 2020-06-01 | End: 2020-07-21

## 2020-06-01 NOTE — TELEPHONE ENCOUNTER
Medication: Memantine      Date of last refill: 4/13/2020 for #90/0 additional refills  [place]    Last office visit: 1/21/2020  Due back to clinic per last office note:  6 months  Date next office visit scheduled:

## 2020-07-21 NOTE — PROGRESS NOTES
[facility] Neurology outpatient progress note  Date of service: 7/21/2020    Patient here for a follow-up visit for dementia; Since last visit her memory is stable;  Tolerating medication/aricept 10 mg and low dose of namenda without side effects, neuropsych is c/ needed for Wheezing.  Uses when exposed to cigarette smoke , Disp: , Rfl:   Allergies:    Sulfa Drugs Cross R*    RASH  Egg Yolk                NAUSEA AND VOMITING  Other                   OTHER (SEE COMMENTS)    Comment:Congestion when around cigarette smo SURGICAL HISTORY  08/24/2018    Lt partial nephrectomy Dr. Tita Castillo   • Radha Carballo   • SPECIAL SERVICE OR REPORT  7/7/04    LEFT KNEE ARTHROSCOPY FOR MENISCAL TEAR;BACH'S CYST   • SPECIAL SERVICE OR REPORT  12/04    RIGHT  CTS REPA indicates understanding of these issues and agrees with the plan. Discussed with patient in detail regarding the adverse and side effects of the medications.   RTC 6 months  Pt is advised to go ER for any new or worsening symptoms    Flossie Som Renella Chuck) Elmon Meigs

## 2020-10-05 NOTE — TELEPHONE ENCOUNTER
Medication: DONEPEZIL HCL 10 MG Oral Tab    Date of last refill: 01/09/2020 (#90/1)  [place]    Last office visit: 7/21/2020  Due back to clinic per last office note:  Around 01/21/2021  Date next office visit schedu

## 2021-02-11 DIAGNOSIS — Z23 NEED FOR VACCINATION: ICD-10-CM

## 2021-02-16 PROBLEM — I70.8 AORTO-ILIAC ATHEROSCLEROSIS (HCC): Status: ACTIVE | Noted: 2021-02-16

## 2021-02-16 PROBLEM — I70.8 AORTO-ILIAC ATHEROSCLEROSIS: Status: ACTIVE | Noted: 2021-02-16

## 2021-02-16 PROBLEM — I70.0 AORTO-ILIAC ATHEROSCLEROSIS: Status: ACTIVE | Noted: 2021-02-16

## 2021-02-16 PROBLEM — I70.0 AORTIC ATHEROSCLEROSIS: Status: ACTIVE | Noted: 2021-02-16

## 2021-02-16 PROBLEM — I70.0 AORTIC ATHEROSCLEROSIS (HCC): Status: ACTIVE | Noted: 2021-02-16

## 2021-02-16 PROBLEM — C64.2 RENAL CELL CANCER, LEFT (HCC): Status: RESOLVED | Noted: 2018-08-31 | Resolved: 2021-02-16

## 2021-02-16 PROBLEM — I70.0 AORTO-ILIAC ATHEROSCLEROSIS (HCC): Status: ACTIVE | Noted: 2021-02-16

## 2021-02-16 PROBLEM — Z85.528 HISTORY OF RENAL CELL CANCER: Status: ACTIVE | Noted: 2021-02-16

## 2021-02-16 PROBLEM — D32.9 MENINGIOMA (HCC): Status: ACTIVE | Noted: 2021-02-16

## 2021-03-04 PROBLEM — G31.84 MILD COGNITIVE IMPAIRMENT: Status: RESOLVED | Noted: 2018-10-05 | Resolved: 2021-03-04

## 2021-03-09 ENCOUNTER — IMMUNIZATION (OUTPATIENT)
Dept: LAB | Age: 81
End: 2021-03-09
Attending: HOSPITALIST
Payer: MEDICARE

## 2021-03-09 DIAGNOSIS — Z23 NEED FOR VACCINATION: Primary | ICD-10-CM

## 2021-03-09 PROCEDURE — 0001A SARSCOV2 VAC 30MCG/0.3ML IM: CPT

## 2021-03-30 ENCOUNTER — IMMUNIZATION (OUTPATIENT)
Dept: LAB | Age: 81
End: 2021-03-30
Attending: HOSPITALIST
Payer: MEDICARE

## 2021-03-30 DIAGNOSIS — Z23 NEED FOR VACCINATION: Primary | ICD-10-CM

## 2021-03-30 PROCEDURE — 0002A SARSCOV2 VAC 30MCG/0.3ML IM: CPT

## 2021-09-28 NOTE — TELEPHONE ENCOUNTER
Pt made appt for 10/11/21    Medication: MEMANTINE HCL 10 MG     Date of last refill: 7/21/20 (#180/3)  [place]    Last office visit: 7/21/20  Due back to clinic per last office note:  6 months  Date next office visit

## 2022-04-13 RX ORDER — DONEPEZIL HYDROCHLORIDE 10 MG/1
10 TABLET, FILM COATED ORAL NIGHTLY
Qty: 90 TABLET | Refills: 0 | Status: SHIPPED | OUTPATIENT
Start: 2022-04-13

## 2022-05-16 ENCOUNTER — TELEPHONE (OUTPATIENT)
Dept: NEUROLOGY | Facility: CLINIC | Age: 82
End: 2022-05-16

## 2022-05-16 ENCOUNTER — OFFICE VISIT (OUTPATIENT)
Dept: NEUROLOGY | Facility: CLINIC | Age: 82
End: 2022-05-16
Payer: COMMERCIAL

## 2022-05-16 VITALS — RESPIRATION RATE: 16 BRPM | DIASTOLIC BLOOD PRESSURE: 64 MMHG | HEART RATE: 80 BPM | SYSTOLIC BLOOD PRESSURE: 110 MMHG

## 2022-05-16 DIAGNOSIS — F02.80 EARLY ONSET ALZHEIMER'S DEMENTIA WITHOUT BEHAVIORAL DISTURBANCE (HCC): ICD-10-CM

## 2022-05-16 DIAGNOSIS — G30.0 EARLY ONSET ALZHEIMER'S DEMENTIA WITHOUT BEHAVIORAL DISTURBANCE (HCC): ICD-10-CM

## 2022-05-16 PROCEDURE — 99215 OFFICE O/P EST HI 40 MIN: CPT | Performed by: OTHER

## 2022-05-16 PROCEDURE — 3078F DIAST BP <80 MM HG: CPT | Performed by: OTHER

## 2022-05-16 PROCEDURE — 3074F SYST BP LT 130 MM HG: CPT | Performed by: OTHER

## 2022-05-16 RX ORDER — MEMANTINE HYDROCHLORIDE 10 MG/1
10 TABLET ORAL 2 TIMES DAILY
Qty: 180 TABLET | Refills: 3 | Status: SHIPPED | OUTPATIENT
Start: 2022-05-16

## 2022-05-16 RX ORDER — DONEPEZIL HYDROCHLORIDE 10 MG/1
10 TABLET, FILM COATED ORAL NIGHTLY
Qty: 90 TABLET | Refills: 3 | Status: SHIPPED | OUTPATIENT
Start: 2022-05-16

## 2022-05-16 NOTE — TELEPHONE ENCOUNTER
Patient brought in power of attorny paperwork to have put in chart.   Scanned into documents tab and sent to scanning

## 2022-07-30 ENCOUNTER — TELEPHONE ENCOUNTER (OUTPATIENT)
Age: 82
End: 2022-07-30

## 2022-07-31 ENCOUNTER — TELEPHONE ENCOUNTER (OUTPATIENT)
Age: 82
End: 2022-07-31

## 2022-11-03 DIAGNOSIS — G30.0 EARLY ONSET ALZHEIMER'S DEMENTIA WITHOUT BEHAVIORAL DISTURBANCE (HCC): ICD-10-CM

## 2022-11-03 DIAGNOSIS — F02.80 EARLY ONSET ALZHEIMER'S DEMENTIA WITHOUT BEHAVIORAL DISTURBANCE (HCC): ICD-10-CM

## 2022-11-04 RX ORDER — MEMANTINE HYDROCHLORIDE 10 MG/1
TABLET ORAL
Qty: 180 TABLET | Refills: 3 | OUTPATIENT
Start: 2022-11-04

## 2023-04-04 ENCOUNTER — OFFICE VISIT (OUTPATIENT)
Dept: NEUROLOGY | Facility: CLINIC | Age: 83
End: 2023-04-04
Payer: MEDICARE

## 2023-04-04 VITALS
SYSTOLIC BLOOD PRESSURE: 132 MMHG | DIASTOLIC BLOOD PRESSURE: 72 MMHG | HEART RATE: 72 BPM | BODY MASS INDEX: 31 KG/M2 | WEIGHT: 180.69 LBS | RESPIRATION RATE: 16 BRPM

## 2023-04-04 DIAGNOSIS — G30.0 EARLY ONSET ALZHEIMER'S DEMENTIA WITHOUT BEHAVIORAL DISTURBANCE (HCC): ICD-10-CM

## 2023-04-04 DIAGNOSIS — F02.80 EARLY ONSET ALZHEIMER'S DEMENTIA WITHOUT BEHAVIORAL DISTURBANCE (HCC): ICD-10-CM

## 2023-04-04 PROCEDURE — 3078F DIAST BP <80 MM HG: CPT | Performed by: OTHER

## 2023-04-04 PROCEDURE — 3075F SYST BP GE 130 - 139MM HG: CPT | Performed by: OTHER

## 2023-04-04 PROCEDURE — 99215 OFFICE O/P EST HI 40 MIN: CPT | Performed by: OTHER

## 2023-04-04 RX ORDER — MEMANTINE HYDROCHLORIDE 10 MG/1
10 TABLET ORAL 2 TIMES DAILY
Qty: 180 TABLET | Refills: 3 | Status: SHIPPED | OUTPATIENT
Start: 2023-04-04

## 2023-04-04 RX ORDER — QUETIAPINE FUMARATE 25 MG/1
12.5 TABLET, FILM COATED ORAL 2 TIMES DAILY PRN
Qty: 60 TABLET | Refills: 3 | Status: SHIPPED | OUTPATIENT
Start: 2023-04-04

## 2023-04-04 RX ORDER — RIVASTIGMINE TARTRATE 1.5 MG/1
1.5 CAPSULE ORAL 2 TIMES DAILY
Qty: 60 CAPSULE | Refills: 1 | Status: SHIPPED | OUTPATIENT
Start: 2023-04-04

## 2023-04-05 RX ORDER — RIVASTIGMINE TARTRATE 1.5 MG/1
CAPSULE ORAL
Qty: 180 CAPSULE | Refills: 0 | OUTPATIENT
Start: 2023-04-05

## 2023-04-06 ENCOUNTER — TELEPHONE (OUTPATIENT)
Dept: SURGERY | Facility: CLINIC | Age: 83
End: 2023-04-06

## 2023-04-06 NOTE — TELEPHONE ENCOUNTER
Pt's daughter needs to know which medication Pt needs to stop taking. Best call back number is [phone]. Endorsed to [facility].

## 2023-05-04 RX ORDER — RIVASTIGMINE TARTRATE 1.5 MG/1
CAPSULE ORAL
Qty: 180 CAPSULE | Refills: 0 | Status: SHIPPED | OUTPATIENT
Start: 2023-05-04

## 2023-05-12 ENCOUNTER — TELEPHONE (OUTPATIENT)
Dept: SURGERY | Facility: CLINIC | Age: 83
End: 2023-05-12

## 2023-06-02 RX ORDER — RIVASTIGMINE TARTRATE 1.5 MG/1
CAPSULE ORAL
Qty: 180 CAPSULE | Refills: 0 | OUTPATIENT
Start: 2023-06-02

## 2023-06-08 RX ORDER — RIVASTIGMINE TARTRATE 1.5 MG/1
CAPSULE ORAL
Qty: 180 CAPSULE | Refills: 0 | OUTPATIENT
Start: 2023-06-08

## 2023-06-08 NOTE — TELEPHONE ENCOUNTER
RIVASTIGMINE 1.5 MG Oral Cap   Medication history and office notes reviewed. Medication refused- refill is too soon.  Rx sent 5/4/23 for #180/0

## 2023-09-07 DIAGNOSIS — G30.0 EARLY ONSET ALZHEIMER'S DEMENTIA WITHOUT BEHAVIORAL DISTURBANCE (HCC): ICD-10-CM

## 2023-09-07 DIAGNOSIS — F02.80 EARLY ONSET ALZHEIMER'S DEMENTIA WITHOUT BEHAVIORAL DISTURBANCE (HCC): ICD-10-CM

## 2023-09-07 NOTE — TELEPHONE ENCOUNTER
Spoke with the pharmacy who states that the patient obtained her prescription and to disregard the refill request.       Medication: RIVASTIGMINE 1.5 MG Oral Cap      Date of last refill: 08/29/2023 (#180/0)  [place]     Last office visit: 04/04/2023  Due back to clinic per last office note:  6 months  Date next office visit scheduled:    Future Appointments   Date Time Provider Josselyn Pedraza   10/10/2023  1:20 PM Stevenson Moeller MD [facility] [address]           Last OV note recommendation:    Plan:   namenda  10 mg bid   Change aricept to exelon  seroquel low dose prn  Brain game exercise encouraged  PCP follow up   Advised no driving  See orders and medications filed with this encounter. The patient indicates understanding of these issues and agrees with the plan. Discussed with patient in detail regarding the adverse and side effects of the medications.   RTC 6 months  Pt should go ER for any new or worsening symptoms

## 2023-09-08 RX ORDER — RIVASTIGMINE TARTRATE 1.5 MG/1
1.5 CAPSULE ORAL 2 TIMES DAILY
Qty: 180 CAPSULE | Refills: 0 | OUTPATIENT
Start: 2023-09-08

## 2025-02-12 ENCOUNTER — TELEPHONE (OUTPATIENT)
Dept: NEUROLOGY | Facility: CLINIC | Age: 85
End: 2025-02-12

## 2025-02-12 DIAGNOSIS — G30.0 EARLY ONSET ALZHEIMER'S DEMENTIA WITHOUT BEHAVIORAL DISTURBANCE (HCC): ICD-10-CM

## 2025-02-12 DIAGNOSIS — F02.80 EARLY ONSET ALZHEIMER'S DEMENTIA WITHOUT BEHAVIORAL DISTURBANCE (HCC): ICD-10-CM

## 2025-02-12 RX ORDER — RIVASTIGMINE TARTRATE 3 MG/1
3 CAPSULE ORAL 2 TIMES DAILY
Qty: 180 CAPSULE | Refills: 0 | Status: SHIPPED | OUTPATIENT
Start: 2025-02-12

## 2025-02-12 NOTE — TELEPHONE ENCOUNTER
Patient has refill remaining, transferring to mail order.    Medication: Rivastigmine 3mg     Date of last refill: 11/15/24 (#180/1)  [place] 11/18/24 #180     Last office visit: 3/26/24  Due back to clinic per last office note:  12m  Date next office visit scheduled:    Future Appointments   Date Time Provider Department Center   3/11/2025  1:40 PM Britta Parikh MD [facility] [address] [facility] [address]           Last OV note recommendation:    namenda  10 mg bid   exelon 3 mg bid  seroquel low dose prn for agitation   Brain game exercise encouraged  PCP follow up   See orders and medications filed with this encounter. The patient indicates understanding of these issues and agrees with the plan.  Discussed with patient in detail regarding the adverse and side effects of the medications.  RTC 12 months  Pt should go ER for any new or worsening symptoms

## 2025-02-12 NOTE — TELEPHONE ENCOUNTER
Pharmacy states Pt needs new RX for Rivastigmine 3 MG Oral Cap. Please send to [facility] AT Portal to Registered [facility] Sites, [phone], [phone]. Endorsed to [facility].

## (undated) DIAGNOSIS — F02.80 EARLY ONSET ALZHEIMER'S DEMENTIA WITHOUT BEHAVIORAL DISTURBANCE (HCC): ICD-10-CM

## (undated) DIAGNOSIS — G30.0 EARLY ONSET ALZHEIMER'S DEMENTIA WITHOUT BEHAVIORAL DISTURBANCE (HCC): ICD-10-CM

## (undated) DEVICE — SUTURE VICRYL 0 UR-6

## (undated) DEVICE — LAP CHOLE/APPY CDS-LF: Brand: MEDLINE INDUSTRIES, INC.

## (undated) DEVICE — VISUALIZATION SYSTEM: Brand: CLEARIFY

## (undated) DEVICE — PROGRASP FORCEPS: Brand: ENDOWRIST

## (undated) DEVICE — SUTURE VICRYL 0

## (undated) DEVICE — SUTURE MONOCRYL 4-0 PS-2

## (undated) DEVICE — TRI-LUMEN FILTERED TUBE SET WITH ACTIVATED CHARCOAL FILTER: Brand: AIRSEAL

## (undated) DEVICE — PAD SACRAL SPAN AID

## (undated) DEVICE — LARGE NEEDLE DRIVER: Brand: ENDOWRIST

## (undated) DEVICE — MONOPOLAR CURVED SCISSORS: Brand: ENDOWRIST

## (undated) DEVICE — KENDALL SCD EXPRESS SLEEVES, KNEE LENGTH, MEDIUM: Brand: KENDALL SCD

## (undated) DEVICE — GOWN,SIRUS,FABRIC-REINFORCED,X-LARGE: Brand: MEDLINE

## (undated) DEVICE — SUTURE VICRYL 0 CT-1

## (undated) DEVICE — TIP COVER ACCESSORY

## (undated) DEVICE — CLIP HEMOLOK LARGE PURPLE

## (undated) DEVICE — TOWEL: OR BLU 80/CS: Brand: MEDICAL ACTION INDUSTRIES

## (undated) DEVICE — BLADELESS OBTURATOR: Brand: WECK VISTA

## (undated) DEVICE — CANNULA SEAL

## (undated) DEVICE — Device

## (undated) DEVICE — SUTURE ETHILON 2-0 FS

## (undated) DEVICE — STERILE POLYISOPRENE POWDER-FREE SURGICAL GLOVES: Brand: PROTEXIS

## (undated) DEVICE — ANCHOR TISSUE RETRIEVAL SYSTEM, BAG SIZE 1200 ML, PORT SIZE 15 MM: Brand: ANCHOR TISSUE RETRIEVAL SYSTEM

## (undated) DEVICE — SUTURE VLOC 90 3-0 9\" 0644

## (undated) DEVICE — COLUMN DRAPE

## (undated) DEVICE — DERMABOND LIQUID ADHESIVE

## (undated) DEVICE — #15 STERILE STAINLESS BLADE: Brand: STERILE STAINLESS BLADES

## (undated) DEVICE — CAUTERY PENCIL

## (undated) DEVICE — DRAIN BLAKE ROUND 15FR

## (undated) DEVICE — DRAIN RELIAVAC 100CC

## (undated) DEVICE — FENESTRATED BIPOLAR FORCEPS: Brand: ENDOWRIST

## (undated) DEVICE — ARM DRAPE

## (undated) DEVICE — COVER,MAYO STAND,STERILE: Brand: MEDLINE

## (undated) DEVICE — AIRSEAL 12 MM ACCESS PORT AND PALM GRIP OBTURATOR WITH BLADELESS OPTICAL TIP, 120 MM LENGTH: Brand: AIRSEAL

## (undated) DEVICE — SUTURE PROLENE 4-0 RB-1